# Patient Record
Sex: MALE | Race: BLACK OR AFRICAN AMERICAN | NOT HISPANIC OR LATINO | Employment: UNEMPLOYED | URBAN - METROPOLITAN AREA
[De-identification: names, ages, dates, MRNs, and addresses within clinical notes are randomized per-mention and may not be internally consistent; named-entity substitution may affect disease eponyms.]

---

## 2020-11-28 ENCOUNTER — HOSPITAL ENCOUNTER (EMERGENCY)
Facility: CLINIC | Age: 35
Discharge: HOME | End: 2020-11-28
Attending: EMERGENCY MEDICINE | Admitting: EMERGENCY MEDICINE
Payer: COMMERCIAL

## 2020-11-28 VITALS
SYSTOLIC BLOOD PRESSURE: 104 MMHG | TEMPERATURE: 98.4 F | OXYGEN SATURATION: 97 % | DIASTOLIC BLOOD PRESSURE: 60 MMHG | HEART RATE: 69 BPM | RESPIRATION RATE: 12 BRPM

## 2020-11-28 DIAGNOSIS — Z20.822 SUSPECTED COVID-19 VIRUS INFECTION: ICD-10-CM

## 2020-11-28 LAB
SARS-COV-2 RNA SPEC QL NAA+PROBE: NOT DETECTED
SPECIMEN SOURCE: NORMAL

## 2020-11-28 PROCEDURE — 99283 EMERGENCY DEPT VISIT LOW MDM: CPT | Performed by: EMERGENCY MEDICINE

## 2020-11-28 PROCEDURE — 250N000013 HC RX MED GY IP 250 OP 250 PS 637: Performed by: EMERGENCY MEDICINE

## 2020-11-28 PROCEDURE — 250N000013 HC RX MED GY IP 250 OP 250 PS 637

## 2020-11-28 PROCEDURE — U0003 INFECTIOUS AGENT DETECTION BY NUCLEIC ACID (DNA OR RNA); SEVERE ACUTE RESPIRATORY SYNDROME CORONAVIRUS 2 (SARS-COV-2) (CORONAVIRUS DISEASE [COVID-19]), AMPLIFIED PROBE TECHNIQUE, MAKING USE OF HIGH THROUGHPUT TECHNOLOGIES AS DESCRIBED BY CMS-2020-01-R: HCPCS | Performed by: EMERGENCY MEDICINE

## 2020-11-28 PROCEDURE — C9803 HOPD COVID-19 SPEC COLLECT: HCPCS | Performed by: EMERGENCY MEDICINE

## 2020-11-28 RX ORDER — IBUPROFEN 600 MG/1
600 TABLET, FILM COATED ORAL ONCE
Status: COMPLETED | OUTPATIENT
Start: 2020-11-28 | End: 2020-11-28

## 2020-11-28 RX ORDER — ACETAMINOPHEN 325 MG/1
975 TABLET ORAL ONCE
Status: COMPLETED | OUTPATIENT
Start: 2020-11-28 | End: 2020-11-28

## 2020-11-28 RX ORDER — ACETAMINOPHEN 325 MG/1
TABLET ORAL
Status: COMPLETED
Start: 2020-11-28 | End: 2020-11-28

## 2020-11-28 RX ADMIN — ACETAMINOPHEN 975 MG: 325 TABLET, FILM COATED ORAL at 01:59

## 2020-11-28 RX ADMIN — IBUPROFEN 600 MG: 600 TABLET, FILM COATED ORAL at 01:59

## 2020-11-28 RX ADMIN — ACETAMINOPHEN 325 MG: 325 TABLET, FILM COATED ORAL at 02:04

## 2020-11-28 ASSESSMENT — ENCOUNTER SYMPTOMS
COUGH: 1
SHORTNESS OF BREATH: 1
FEVER: 0
ABDOMINAL PAIN: 0

## 2020-11-28 NOTE — ED AVS SNAPSHOT
[facility] Emergency Department  [address]  [place] [address]  Phone: [phone]  Fax: [phone]                                    Blayne Espinoza   MRN: [id number]    Department: [facility] Emergency Department   Date of Visit: 11/28/2020           After Visit Summary Signature Page    I have received my discharge instructions, and my questions have been answered. I have discussed any challenges I see with this plan with the nurse or doctor.    ..........................................................................................................................................  Patient/Patient Representative Signature      ..........................................................................................................................................  Patient Representative Print Name and Relationship to Patient    ..................................................               ................................................  Date                                   Time    ..........................................................................................................................................  Reviewed by Signature/Title    ...................................................              ..............................................  Date                                               Time          22EPIC Rev 08/18

## 2020-11-28 NOTE — DISCHARGE INSTRUCTIONS
TODAY'S VISIT  YOU ARE BEING TESTED FOR COVID-19 (NOVEL CORONAVIRUS).   -  The cause of your symptoms is not yet known, but you are being tested for COVID-19.  -  It has been determined that you do not medically need to be hospitalized at this time, and  you can be monitored with self-isolation at home.     YOUR TESTS FOR THIS ILLNESS ARE CURRENTLY IN PROCESS.    -  These tests are performed by the [place] Department of Health.  -  Our staff will contact you with your results, likely within the next 24-72 hours.  -  If your test is positive, you will also be contacted by the [place] Department [facility].   -  Please remain under home isolation precautions until your healthcare provider and/or state and local health department tell you it is safe, and you no longer need to self-isolate at home.     SELF-ISOLATION INSTRUCTIONS  STAY HOME. Do not go to work, school, or public areas. Avoid using public transportation, ride-sharing (Uber/Lyft), or taxis. You should only leave your home if you require medical attention (See instructions below, please contact your provider first.)   SEPARATE YOURSELF FROM OTHER PEOPLE AND ANIMALS. As much as possible, you should stay in a specific room and away from other people in your home. You should use a separate bathroom, if available. Avoid contact with pets and other animals. When possible, have another household member care for your  family and animals while you are sick.   DO NOT SHARE HOUSEHOLD ITEMS. Do not share dishes, drinking glasses, eating utensils, towels, bedding, etc., with others or pets in your home. These items should be washed with soap and water.   WEAR A FACEMASK. Wear a facemask if you need to be around other people, and cover your mouth and nose with tissue when you cough or sneeze.  WASH YOUR HANDS OFTEN. Wash your hands with soap and water for at least 20 second, or use hand  regularly. Avoid touching your face with unwashed hands.      SELF-MONITORING INSTRUCTIONS  SEEK PROMPT MEDICAL ATTENTION IF YOUR ILLNESS IS WORSENING. Watch closely for new or worsening symptoms, such as fever, cough, shortness of breath or difficulty breathing.   SIGN UP FOR [place]. Sign up for the Swirl application, using the information at the end of this document. Your results and a message about those results can be sent through [employer]. If you do not have ZummZummhart, we will call you with your results, but it may take longer.  IF YOU NEED MEDICAL CARE OR HAVE A MEDICAL APPOINTMENT (and it is not an emergency):   -  CALL YOUR HEALTHCARE PROVIDER BEFORE GOING TO THE [facility]  -  TELL THEM THAT YOU ARE BEING TESTING FOR AND MAY HAVE COVID-19.  YOUR CLOSE CONTACTS SHOULD MONITOR THEIR HEALTH. If your close contacts develop symptoms, they should visit [email or web address] to determine if testing is needed, and where this is done.   If you have any questions, please contact your usual health care provider or the [facility] ([facility]) at [phone]    EMERGENCY INSTRUCTIONS  IF YOU NEED EMERGENCY MEDICAL ATTENTION, CALL 911 AND LET THEM KNOW YOU MAY HAVE ARE BEING EVALUATED FOR COVID-19.      WHAT IS COVID-19 (CORONAVIRUS)  COVID-19 is a viral respiratory illness caused by a newly identified coronavirus that was discovered in late 2019 in [place]. Coronaviruses are a large family of viruses. Some coronaviruses cause illnesses in people and others only circulate among animals. Rarely, animal coronaviruses can evolve and infect people. The virus causing COVID-19 may have emerged from an animal source, and it is now able to spread from person to person.     How does it spread?   The virus is thought to spread between people who are in close contact (within about six feet) through respiratory droplets produced when an infected person coughs, sneezes, or talks. It may also spread when one person touches a surface or object that has the virus on it and then  touches their mouth, nose, or eyes. However, this is not thought to be the main way the virus is transmitted.    SYMPTOMS  This coronavirus causes mild to severe respiratory illness with often with fever, cough, and/or difficulty breathing. After exposure, symptoms typically present within 2 to 14 days.     TREATMENTS AND PREVENTION  Patients may also be asked to self-quarantine at home in order to prevent the spread of the coronavirus. There is no antiviral treatment recommended for COVID-19. People with COVID-19 may receive supportive care to help relieve symptoms.    Prevention  No vaccine is currently available for the coronavirus causing COVID-19. The best way to prevent spread of the illness is to avoid exposure through simple precautions. Prevention steps include:   Stay home if you're feeling sick.   Avoid close contact with others, and try to stay at least 6-feet away from others if you're ill.   Wash your hands often with soap and warm water for at least 20 seconds, especially after going to the bathroom; before eating; and after blowing your nose, coughing, or sneezing. Use an alcohol-based hand  with at least 60 percent alcohol if soap and water are not readily available.   Clean and disinfect frequently touched objects and surfaces using a regular household cleaning spray or wipe.     Thank you for your understanding and cooperation.
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
## 2021-03-08 ENCOUNTER — OFFICE VISIT (OUTPATIENT)
Dept: BEHAVIORAL HEALTH | Facility: CLINIC | Age: 36
End: 2021-03-08
Payer: COMMERCIAL

## 2021-03-08 ENCOUNTER — HOSPITAL ENCOUNTER (EMERGENCY)
Facility: CLINIC | Age: 36
Discharge: HOME | End: 2021-03-08
Admitting: EMERGENCY MEDICINE
Payer: COMMERCIAL

## 2021-03-08 VITALS
SYSTOLIC BLOOD PRESSURE: 116 MMHG | DIASTOLIC BLOOD PRESSURE: 70 MMHG | TEMPERATURE: 97.9 F | HEART RATE: 88 BPM | OXYGEN SATURATION: 99 % | RESPIRATION RATE: 16 BRPM

## 2021-03-08 DIAGNOSIS — F11.90 OPIOID USE DISORDER: Primary | ICD-10-CM

## 2021-03-08 DIAGNOSIS — F11.10 OPIOID ABUSE (H): ICD-10-CM

## 2021-03-08 LAB
AMPHETAMINES UR QL SCN: NEGATIVE
BARBITURATES UR QL: NEGATIVE
BENZODIAZ UR QL: NEGATIVE
CANNABINOIDS UR QL SCN: NEGATIVE
COCAINE UR QL: NEGATIVE
ETHANOL UR QL SCN: NEGATIVE
FENTANYL UR QL: POSITIVE
OPIATES UR QL SCN: POSITIVE
OPIATES UR QL SCN: POSITIVE

## 2021-03-08 PROCEDURE — 99205 OFFICE O/P NEW HI 60 MIN: CPT | Performed by: FAMILY MEDICINE

## 2021-03-08 PROCEDURE — 80307 DRUG TEST PRSMV CHEM ANLYZR: CPT | Performed by: EMERGENCY MEDICINE

## 2021-03-08 PROCEDURE — 80320 DRUG SCREEN QUANTALCOHOLS: CPT | Performed by: EMERGENCY MEDICINE

## 2021-03-08 PROCEDURE — 99284 EMERGENCY DEPT VISIT MOD MDM: CPT | Performed by: EMERGENCY MEDICINE

## 2021-03-08 PROCEDURE — 99283 EMERGENCY DEPT VISIT LOW MDM: CPT

## 2021-03-08 RX ORDER — GABAPENTIN 300 MG/1
300 CAPSULE ORAL 3 TIMES DAILY
Qty: 9 CAPSULE | Refills: 0 | Status: SHIPPED | OUTPATIENT
Start: 2021-03-08 | End: 2021-03-15

## 2021-03-08 RX ORDER — BUPRENORPHINE AND NALOXONE 8; 2 MG/1; MG/1
2 FILM, SOLUBLE BUCCAL; SUBLINGUAL DAILY
Qty: 14 FILM | Refills: 0 | Status: SHIPPED | OUTPATIENT
Start: 2021-03-08 | End: 2021-03-15

## 2021-03-08 RX ORDER — CLONIDINE HYDROCHLORIDE 0.1 MG/1
0.1 TABLET ORAL 4 TIMES DAILY PRN
Qty: 12 TABLET | Refills: 0 | Status: SHIPPED | OUTPATIENT
Start: 2021-03-08 | End: 2021-03-15

## 2021-03-08 RX ORDER — ONDANSETRON 4 MG/1
4 TABLET, FILM COATED ORAL EVERY 8 HOURS PRN
Qty: 9 TABLET | Refills: 0 | Status: SHIPPED | OUTPATIENT
Start: 2021-03-08

## 2021-03-08 RX ORDER — TRAZODONE HYDROCHLORIDE 50 MG/1
50 TABLET, FILM COATED ORAL AT BEDTIME
Qty: 30 TABLET | Refills: 0 | Status: SHIPPED | OUTPATIENT
Start: 2021-03-08

## 2021-03-08 ASSESSMENT — ENCOUNTER SYMPTOMS
FEVER: 0
COUGH: 0
CHILLS: 1
NAUSEA: 0
ABDOMINAL PAIN: 0
MYALGIAS: 1
VOMITING: 0
ARTHRALGIAS: 1
DIAPHORESIS: 1
SLEEP DISTURBANCE: 1

## 2021-03-08 NOTE — PATIENT INSTRUCTIONS
"When it has been 24 hours from your last use of other opioids, start buprenorphine with 2mg (1/4 of 8mg film.)  Wait 30 minutes.    If withdrawal symptoms are not worse, take the remaining 3/4 of film (6mg.)  If withdrawal symptoms do worsen with this first \"test dose,\" use other medications for withdrawal symptoms, and start buprenorphine again the next day.     After the first 8mg dose on day one, if you develop more withdrawal symptoms or cravings in 1-2 hours, you can take another 1/2 film (4mg.)  Again, 2 hours later, you can take another 1/2 film (4mg) to treat symptoms.     Starting day 2, take 16mg buprenorphine daily until your next appointment.     Schedule a follow up appointment in the [facility] in 2 days.  Let medical staff know of any problems in the meantime.   [facility] [address] [address]    Phone: [phone]    Hours: Monday, Wednesday, Friday 8:30a-4:30p    After hours medical questions: [phone]    "

## 2021-03-08 NOTE — DISCHARGE INSTRUCTIONS
Opioid Withdrawal  Opioid withdrawal occurs if you've used opioids daily for at least 3 weeks. Symptoms often start about 12 hours after the last dose of the opioid. But this varies greatly. It depends on which opioid you were using and how you were taking it (injecting, snorting, or pills). Withdrawal symptoms last from 3 to 5 days. They may include yawning, sweating, runny nose, restlessness, stomach cramping, diarrhea, nausea, vomiting, hot and cold flashes, and trouble sleeping.   Home care  Follow these guidelines when caring for yourself at home:   Stay with someone who can help you and give you emotional support during this time. Don't take more of the addicting drug to stop your symptoms.  If you have stomach cramps, nausea, or vomiting, take only clear liquids until the symptoms get better. Adults should drink a total of 2 to 3 quarts of liquid daily. It's best to take small frequent drinks rather than a few large ones. You may have liquids in any of these forms: mineral water, apple juice, sports drinks, soft drinks without caffeine, clear broth soups, plain gelatin, and ice pops.  If you've been prescribed medicines to help manage your withdrawal symptoms, take them exactly as prescribed. Don't change or stop your medicine without calling your provider.  Don't use alcohol, caffeine, or tobacco during this time.  If you were given a clonidine patch, leave this on for 7 days. Call your provider if you have excess dizziness or drowsiness.  Follow-up care  Follow up with your healthcare provider as advised. After you've gone through withdrawal, enroll in an outpatient treatment program. Getting through withdrawal is the first step in a long journey to living a drug-free life. Having trained professionals support you will make it more likely that you'll succeed.   When to get medical advice  Call your healthcare provider right away if you have any of these:   Fever of 100.4 F (38 C) or higher, or as directed  by your provider  Shaking chills  Inability to keep down liquids for 8 hours  Frequent diarrhea  Signs of infection at the site of IV needle use (redness, warmth, pain, or swelling)  Call 911  Call 911 if any of these occur:   Trouble breathing, swallowing, or wheezing  Severe confusion  Extreme drowsiness or trouble awakening  Fainting (loss of consciousness)  Rapid heart rate or very slow heart rate  Very low or very high blood pressure  Vomiting blood, or large amounts of blood in stool  Seizure  [employer]Well last reviewed this educational content on 2/1/2020 2000-2020 [employer]. All rights reserved. This information is not intended as a substitute for professional medical care. Always follow your healthcare professional's instructions.          Naloxone (Narcan)  Frequently-asked questions  What is naloxone (Narcan)?  Naloxone (Narcan) is a prescription medicine that can reverse an overdose from opioid medicines, such as:  codeine, morphine  hydrocodone, oxycodone, hydromorphone, oxymorphone  fentanyl, meperidine, methadone  buprenorphine  Opioids can cause bad reactions. If you take more opioids than your body can handle (overdose), your breathing can slow too much or even stop. Naloxone blocks the effects of opioids on the brain, which can quickly reverse an overdose.   Naloxone cannot be used to get high and is not addictive. It is safe and effective. Emergency medical professionals have used it to save lives for decades.  Naloxone does not prevent deaths caused by other drugs, such as bath salts, cocaine, methamphetamine, alcohol and benzodiazepines: alprazolam, clonazepam, diazepam, lorazepam.  Who can carry or administer naloxone?  In [place], anyone at risk for having a drug overdose or witnessing one can get a prescription for naloxone. Users, family members and concerned friends can all carry naloxone in the same way people with allergies are allowed to carry an epinephrine syringe  "(\"epi-pen\").   For safety, store naloxone in a locked cabinet or other space that is out of the reach of children and pets.  When should naloxone be given?  If you suspect an opioid overdose, look for these symptoms:  Breathing slows or stops; or, person has pinpoint pupils and won't wake up  No response, even if you shake them or say  their name  Lips and fingernails turn blue, purple or gray  Skin gets painful or clammy  Slowed heartbeat and/or low blood pressure  Even if you have reversed an overdose with naloxone, always call 911. Naloxone usually wears off in 30 to 90 minutes. The person can stop breathing again unless more naloxone is available. An overdose victim will also need other care. For these reasons, it is safest to call 911 and get medical care right away.  How long does naloxone take to work?  Naloxone begins to work in 2 to 5 minutes.  If the person doesn't wake up in 3 minutes, bystanders should give a second dose.  Rescue breathing should be done while you wait for the naloxone to work. This will make sure the person gets enough oxygen to the brain.  How do you give naloxone?  Bystanders can safely and legally spray naloxone into the nose (nasal spray) or inject it into the thigh.   Nasal spray (with assembly): Place the foam tip (atomizer) onto a syringe and put into the nostril. Spray one half of the capsule (1 mg) into each nostril.  Nasal spray (no assembly needed): Spray up one nostril by pushing the plunger.  Injection into the muscle (with assembly or by auto-injector): Inject into the outer thigh. In an emergency, it is safe to inject through clothing. The auto-injector contains a speaker that provides step-by-step instructions.  Can naloxone harm someone?  No. It is safe to give naloxone any time you suspect an overdose. (It will not hurt if you are wrong about it being an overdose.) People who receive naloxone for an overdose may wake up and go into withdrawal. Withdrawal can be " "miserable, but it is better than dying.   Symptoms of withdrawal include:  Feeling nervous, restless or irritable  Body aches  Dizziness or weakness  Diarrhea, stomach pain or feeling a little sick  Fever, chills or goose bumps  Sneezing or runny nose  Talk with your doctor about how to deal with these and other feelings of withdrawal.   Is naloxone just a \"safety net\" that allows users to use even more?  Research has found that having naloxone available does not encourage people to use opioids more. The goal of distributing naloxone and educating people about how to prevent, recognize and intervene in overdoses is to prevent deaths. Other goals, such as decreasing drug use, can only be met if the user is alive.  What are the side effects of naloxone?  Call 911 right away if you have:  An allergic reaction, such as large bumps on your skin (hives), trouble breathing, swelling of the face, lips, tongue or throat. (Don't drive or perform unsafe tasks until you know how you will react to naloxone.)  Chest pain or fast or irregular heart beat  Increase in blood pressure  Muscle pain or cramping  Nasal dryness, congestion or inflammation  Shortness of breath  Sweating, feeling very sick to your stomach or throwing up  Bad headache, agitation, anxiety, confusion or ringing in your ears  Seizures (convulsions)  Dizziness, fainting or feeling like you might pass out  For informational purposes only. Not to replace the advice of your health care provider.   Copyright   2015 [facility]. All rights reserved. [employer] [id number] - REV 07/17.      Instructions:     To manage symptoms of withdrawal, please take your medicines as directed.     If symptoms are severe and the medicines don t help, return to the emergency room.  You will be evaluated and treated for withdrawal symptoms which may include additional buprenorphine (Suboxone, Subutex).  "

## 2021-03-08 NOTE — PROGRESS NOTES
"[facility]    SUBJECTIVE                                                    CC/HPI:    Blayne Espinoza is a 36 year old male w/ PMH HIV, DVT, OUD who presents to the Recovery Clinic for  initial visit to discuss treatment of opioid use disorder.        SUBSTANCE(S)  OF CHOICE   Heroin/fentanyl         Date of last use   3/7/21  History of use  (First use, amount, duration) \"Its been a long time\" smoking uncertain amount of fentanyl daily for at least the last 2 years.  Withdrawal symptoms hot/cold sweats, body aches, restlessness, anxiety, runny nose, GI upset  IV drug use yes in past,  most recent IVDU few months ago  Previous medical treatments (buprenorphine, methadone, naltrexone) denies  Previous psychosocial treatments (residential, IOP, OP, mutual help groups) denies  Longest period of sobriety unknown  Medical complications from substance use (ie. infection, organ damage, seizures) h/o MRSA cellulitis and R UE DVT  History of overdose   unknown  Narcan currently available no    Clinical Opioid Withdrawal Scale (COWS)    Resting Pulse Rate  1  =     Sweating    (over past 1/2 hour) 1  =  subjective report of chills or flushing   Restlessness  3  =  frequent shifting or extraneous movements of legs/arms   Pupil size  1  =  pupils possibly larger than normal for room light   Bone or Joint Aches    (acute only) 1  =  mild diffuse discomfort   Runny nose or tearing    (unrelated to cold/allergies) 2  =  nose running or tearing   GI Upset    (over past 1/2 hour) 2  =  nausea or loose stool   Tremor    (outstretched hands) 0  =  no tremor   Yawning    (during assessment) 0  =  no yawning   Anxiety/Irritability 1  =  patient reports increasing irritability or anxiousness   Gooseflesh skin 0  =  skin is smooth     TOTAL SCORE  Add column for score   12     CONSIDER dose of suboxone with COWS >=8 (moderate withdrawal)  AND if last dose of opioid:    >12hr ago with short acting " agent (heroin, morphine, oxycodone, hydrocodone, fentanyl, etc)    >24hr ago with long acting agent (oxycontin, MS contin, etc)    >4 days ago with methadone      Other Substance Use/Addiction History:  Alcohol  denies  Other opioids only above currently  Benzodiazepines  denies  Stimulants (amphetamine, methamphetamine, cocaine) denies  Cannabis denies  Hallucinogens   denies  Anabolic steroids denies   Behavioral (Gambling, shopping, eating disorder) denies    NICOTINE-few day   Desire to quit no        Hepatitis C Status (born between 1946-65 should have one lifetime test)   Last lab work done 2/12/20 HCV ab nonreactive      [place] Board of Pharmacy Data Base Reviewed:  Yes; as expected      Past Medical History:   Diagnosis Date     Asymptomatic human immunodeficiency virus (HIV) infection status (H)      Latent syphilis     RPR converted to nonreactive 2020       PAST PSYCHIATRIC HISTORY:  Past diagnoses (reported by patient) - none  Suicide Attempts: No     [facility]   Psychiatric provider or therapist: none    No past surgical history on file.    No family history on file.    Social History     Social History Narrative     Not on file         Medications:  No current outpatient medications on file prior to visit.  No current facility-administered medications on file prior to visit.   Pt states he is not on any medications; he is being followed for HIV treatment at [facility], and viral load undetectable as recently as 2/24/21 and pt has had ED visits in past requesting refills of antiretrovirals        No Known Allergies        REVIEW OF SYSTEMS:  General: + acute withdrawal symptoms, see above.  No recent infections or fever  Eyes:  No vision concerns.  No double vision.    Resp: No coughing, wheezing or shortness of breath  CV: No chest pains or palpitations  GI: No  vomiting, abdominal pain, diarrhea.  No constipation  : No urinary frequency or  dysuria    Musculoskeletal: No significant muscle or joint pains other than as above.  No edema  Neurologic: No numbness, tingling, weakness, problems with balance or coordination  Psychiatric: No acute concerns other than as above. See mental status exam for more information.   Skin: No rashes or areas of acute infection      OBJECTIVE                                                        VS from ED reviewed  Physical Exam  Constitutional:       Interventions: Face mask in place.      Comments: Slightly disheveled   HENT:      Head: Normocephalic and atraumatic.   Eyes:      General: No scleral icterus.     Extraocular Movements: Extraocular movements intact.      Conjunctiva/sclera: Conjunctivae normal.   Pulmonary:      Effort: Pulmonary effort is normal.   Neurological:      Mental Status: He is alert and oriented to person, place, and time.      Motor: No tremor.      Coordination: Coordination is intact.      Gait: Gait is intact.   Psychiatric:         Attention and Perception: Attention and perception normal.         Mood and Affect: Mood is anxious. Affect is angry.         Speech: Speech normal.         Behavior: Behavior is agitated. Behavior is cooperative.         Thought Content: Thought content normal.         Cognition and Memory: Cognition normal.      Comments: Insight and judgement are fair           Labs:  Recent Results (from the past 240 hour(s))   Drug abuse screen 6 urine (tox)    Collection Time: 03/08/21  8:47 AM   Result Value Ref Range    Amphetamine Qual Urine Negative NEG^Negative    Barbiturates Qual Urine Negative NEG^Negative    Benzodiazepine Qual Urine Negative NEG^Negative    Cannabinoids Qual Urine Negative NEG^Negative    Cocaine Qual Urine Negative NEG^Negative    Ethanol Qual Urine Negative NEG^Negative    Opiates Qualitative Urine Positive (A) NEG^Negative   Fentanyl Qual Urine    Collection Time: 03/08/21  8:47 AM   Result Value Ref Range    Fentanyl Qual Urine Positive (A)  NEG^Negative   Opiates qualitative urine    Collection Time: 03/08/21  8:47 AM   Result Value Ref Range    Opiates Qualitative Urine Positive (A) NEG^Negative           Recent Results (from the past 240 hour(s))   Drug abuse screen 6 urine (tox)    Collection Time: 03/08/21  8:47 AM   Result Value Ref Range    Amphetamine Qual Urine Negative NEG^Negative    Barbiturates Qual Urine Negative NEG^Negative    Benzodiazepine Qual Urine Negative NEG^Negative    Cannabinoids Qual Urine Negative NEG^Negative    Cocaine Qual Urine Negative NEG^Negative    Ethanol Qual Urine Negative NEG^Negative    Opiates Qualitative Urine Positive (A) NEG^Negative   Fentanyl Qual Urine    Collection Time: 03/08/21  8:47 AM   Result Value Ref Range    Fentanyl Qual Urine Positive (A) NEG^Negative   Opiates qualitative urine    Collection Time: 03/08/21  8:47 AM   Result Value Ref Range    Opiates Qualitative Urine Positive (A) NEG^Negative         ASSESSMENT/PLAN                                                      1. Opioid use disorder (H)  Reviewed instructions for initiation of buprenorphine in setting of fentanyl use to reduce risk of precipitated withdrawal and maximize efficacy.   Encouraged pt to fill naloxone rx and keep this accessible  Discussed use of other medications as needed for withdrawal    - naloxone (NARCAN) 4 MG/0.1ML nasal spray; Spray 1 spray (4 mg) into one nostril alternating nostrils once as needed for opioid reversal every 2-3 minutes until assistance arrives  Dispense: 0.2 mL; Refill: 0  - cloNIDine (CATAPRES) 0.1 MG tablet; Take 1 tablet (0.1 mg) by mouth 4 times daily as needed (withdrawal symptoms)  Dispense: 12 tablet; Refill: 0  - ondansetron (ZOFRAN) 4 MG tablet; Take 1 tablet (4 mg) by mouth every 8 hours as needed for nausea  Dispense: 9 tablet; Refill: 0  - gabapentin (NEURONTIN) 300 MG capsule; Take 1 capsule (300 mg) by mouth 3 times daily  Dispense: 9 capsule; Refill: 0  - traZODone (DESYREL) 50 MG  tablet; Take 1 tablet (50 mg) by mouth At Bedtime  Dispense: 30 tablet; Refill: 0  - buprenorphine HCl-naloxone HCl (SUBOXONE) 8-2 MG per film; Place 2 Film under the tongue daily  Dispense: 14 Film; Refill: 0         Return in about 2 days (around 3/10/2021) for Follow up, with me, in person.      Patient counseling completed today:  Discussed mechanism of action, potential risks/benefits/side effects of medications and other recommendations above.  Discussed risk of precipitated withdrawal with initiation of buprenorphine in the presence of full opioid agonists.  Reviewed directions for initiation of buprenorphine to reduce risk of precipitated withdrawal and maximize efficacy.  Recommend pt keep naloxone in their possession and reviewed other aspects of harm reduction to reduce risk of overdose with return to use.   Recommended avoiding concurrent use of alcohol, benzodiazepines or other sedatives with buprenorphine or other opioids.  Discussed importance of avoiding isolation, building a network of supportive relationships, avoiding people/places/things associated with past use to reduce risk of relapse; including motivational interviewing regarding psychosocial treatment for addiction.     At least 60 min spent in review of medical record,  review, obtaining histories, reviewing symptoms, discussing pt's goals for treatment, arranging follow up.      RERE WINTER MD    [place] [id number] [place] [id number] [phone]
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
## 2021-03-09 ENCOUNTER — OFFICE VISIT (OUTPATIENT)
Dept: BEHAVIORAL HEALTH | Facility: CLINIC | Age: 36
End: 2021-03-09
Payer: COMMERCIAL

## 2021-03-09 DIAGNOSIS — F11.20 OPIATE DEPENDENCE (H): Primary | ICD-10-CM

## 2021-03-09 NOTE — PROGRESS NOTES
Blayne has been struggling with drug use since the loss of several loved ones. States that it is so hard not to use when he thinks about them. Is not interested in treatment at this time. Has stable housing with his mother. Girlfriend is a support. Shared my story, offered hope and encouragement.

## 2021-03-10 ENCOUNTER — OFFICE VISIT (OUTPATIENT)
Dept: BEHAVIORAL HEALTH | Facility: CLINIC | Age: 36
End: 2021-03-10
Payer: COMMERCIAL

## 2021-03-10 VITALS
DIASTOLIC BLOOD PRESSURE: 72 MMHG | HEART RATE: 84 BPM | SYSTOLIC BLOOD PRESSURE: 106 MMHG | TEMPERATURE: 98.5 F | RESPIRATION RATE: 16 BRPM

## 2021-03-10 DIAGNOSIS — F11.90 OPIOID USE DISORDER: Primary | ICD-10-CM

## 2021-03-10 DIAGNOSIS — F15.90 STIMULANT USE DISORDER: ICD-10-CM

## 2021-03-10 PROCEDURE — 99214 OFFICE O/P EST MOD 30 MIN: CPT | Performed by: FAMILY MEDICINE

## 2021-03-10 NOTE — PROGRESS NOTES
"CC/HPI:  Blayne Espinoza is a 36 year old male w/ PMH HIV, DVT, PSUD primarily opioids who returns to  for follow up.  He was initially seen in this clinic 3/8/21 expressing interest in starting treatment with buprenorphine.  He was given rx's for symptoms of opioid withdrawal and rx for buprenorphine with directions for home initiation in the setting of fentanyl use.      Today, pt states he has continued to use fentanyl 3/8/21 and 3/9/21.  He also states he did take buprenorphine since our initial visit, but is uncertain when that occurred.  He reports he also smoked cannabis 3/8/21.  He cannot recall taking any of the medications prescribed for symptoms of withdrawal.     Pt states he returned to use of opioids with fentanyl in 8/2019. He cites the death of his children over the course of a few years as a driving factor of his use.      In review of his PMH and medications, pt states he does take his daily antiretroviral tx for HIV, though he cannot recall the name.  He also states he takes \"little white pill,\" that is supposed to be twice daily, but he takes once daily, but does not really take very often.      Pt continues to live with SO and his family.      Objective:  /72 (BP Location: Left arm)   Pulse 84   Temp 98.5  F (36.9  C)   Resp 16   Physical Exam  Constitutional:       Interventions: Face mask in place.      Comments: Pt is sleepy during the visit, at times seemingly falling asleep for a few seconds at a time   Eyes:      General: No scleral icterus.     Conjunctiva/sclera: Conjunctivae normal.   Neurological:      Motor: No tremor.      Coordination: Coordination is intact.      Gait: Gait is intact.       Labs:  UDS: +THC, +opiates, +amphetamine, +methamphetamine, +oxycodone, +MDMA.  (negative buprenorphine)          A/P  (F11.99) Opioid use disorder (H)  (primary encounter diagnosis)  Comment: Pt with continued use, has not started buprenorphine based on lab data  Plan: reviewed " with pt and pt was given written directions for home initiation of buprenorphine, starting when 24hrs from last use, including discussion of symptomatic rx's provided.  Pt stated he can read, and has help from his SO.  He agreed to also bring in his home medications to next visit for clarification and safety issues.  No buprenorphine rx required this visit, #14 8mg/2mg films rx'd 3/8/21.      (F15.90) Stimulant use disorder      At least 30min spent in chart review, discussion of interim events, review of PMH/medications, review of recommendations    RERE WINTER MD

## 2021-03-10 NOTE — PATIENT INSTRUCTIONS
"When it has been 24 hours from your last use of other opioids, start buprenorphine with 2mg (1/4 of 8mg film.)  Wait 30 minutes.      If withdrawal symptoms are not worse, take the remaining 3/4 of film (6mg.)  If withdrawal symptoms do worsen with this first \"test dose,\" use other medications for withdrawal symptoms, and start buprenorphine again the next day.     After the first 8mg dose on day one, if you develop more withdrawal symptoms or cravings in 1-2 hours, you can take another 1/2 film (4mg.)  Again, 2 hours later, you can take another 1/2 film (4mg) to treat symptoms.     Starting day 2, take 16mg buprenorphine daily until your next appointment.     Schedule a follow up appointment in the Recovery Clinic in 2-4 days.  Let medical staff know of any problems in the meantime.     [facility] [address] [address]    Phone: [phone]    Hours: Monday, Wednesday, Friday 8:30a-4:30p    After hours medical questions: [phone]    "

## 2021-03-15 ENCOUNTER — OFFICE VISIT (OUTPATIENT)
Dept: BEHAVIORAL HEALTH | Facility: CLINIC | Age: 36
End: 2021-03-15
Payer: COMMERCIAL

## 2021-03-15 VITALS
HEART RATE: 82 BPM | SYSTOLIC BLOOD PRESSURE: 119 MMHG | TEMPERATURE: 98.7 F | OXYGEN SATURATION: 100 % | DIASTOLIC BLOOD PRESSURE: 76 MMHG

## 2021-03-15 DIAGNOSIS — F11.90 OPIOID USE DISORDER: ICD-10-CM

## 2021-03-15 PROCEDURE — 99214 OFFICE O/P EST MOD 30 MIN: CPT | Performed by: FAMILY MEDICINE

## 2021-03-15 RX ORDER — CLONIDINE HYDROCHLORIDE 0.1 MG/1
0.1 TABLET ORAL 4 TIMES DAILY PRN
Qty: 12 TABLET | Refills: 0 | Status: SHIPPED | OUTPATIENT
Start: 2021-03-15

## 2021-03-15 RX ORDER — BUPRENORPHINE AND NALOXONE 8; 2 MG/1; MG/1
2 FILM, SOLUBLE BUCCAL; SUBLINGUAL DAILY
Qty: 6 FILM | Refills: 0 | Status: SHIPPED | OUTPATIENT
Start: 2021-03-15

## 2021-03-15 NOTE — PATIENT INSTRUCTIONS
"When it has been 24 hours from your last use of other opioids, start buprenorphine with 2mg (1/4 of 8mg film.)  Wait 30 minutes.      If withdrawal symptoms are not worse, take the remaining 3/4 of film (6mg.)  If withdrawal symptoms do worsen with this first \"test dose,\" use other medications for withdrawal symptoms, and start buprenorphine again the next day.     After the first 8mg dose on day one, if you develop more withdrawal symptoms or cravings in 1-2 hours, you can take another 1/2 film (4mg.)  Again, 2 hours later, you can take another 1/2 film (4mg) to treat symptoms.     Starting day 2, take 16mg buprenorphine daily until your next appointment.     Schedule a follow up appointment in the Recovery Clinic in 2-4 days.  Let medical staff know of any problems in the meantime.     [facility] [address] [address]    Phone: [phone]    Hours: Monday, Wednesday, Friday 8:30a-4:30p    After hours medical questions: [phone]    "

## 2021-03-15 NOTE — PROGRESS NOTES
[facility]    SUBJECTIVE                                                    CC/HPI:    Blayne Espinoza is a 36 year old male who presents to the [facility]  to discuss treatment of opioid use disorder.      Blayne reports he and significant other took more Suboxone than rx'd and then ran out. Were out of all medications by Friday. Patient found out nephew  on Friday. Patient and significant other relapsed and used Fentanyl over the weekend.    Recent Substance Use/Addiction History: Fentanyl yesterday at 4pm     Narcan currently available: Yes    Point of care urine drug screen positive for: MDMA  *POC urine drug screen does not screen for Fentanyl    [place] Board [employer] Pharmacy Data Base Reviewed:  Yes; as expected    Past Medical History:   Diagnosis Date     Asymptomatic human immunodeficiency virus (HIV) infection status (H)      Latent syphilis     RPR converted to nonreactive        Primary care provider: [facility]     No past surgical history on file.    No family history on file.    Medications:  buprenorphine HCl-naloxone HCl (SUBOXONE) 8-2 MG per film, Place 2 Film under the tongue daily (Patient not taking: Reported on 3/15/2021)  cloNIDine (CATAPRES) 0.1 MG tablet, Take 1 tablet (0.1 mg) by mouth 4 times daily as needed (withdrawal symptoms) (Patient not taking: Reported on 3/15/2021)  gabapentin (NEURONTIN) 300 MG capsule, Take 1 capsule (300 mg) by mouth 3 times daily (Patient not taking: Reported on 3/15/2021)  naloxone (NARCAN) 4 MG/0.1ML nasal spray, Spray 1 spray (4 mg) into one nostril alternating nostrils once as needed for opioid reversal every 2-3 minutes until assistance arrives (Patient not taking: Reported on 3/15/2021)  ondansetron (ZOFRAN) 4 MG tablet, Take 1 tablet (4 mg) by mouth every 8 hours as needed for nausea (Patient not taking: Reported on 3/15/2021)  traZODone (DESYREL) 50 MG tablet, Take 1 tablet (50 mg) by mouth  At Bedtime (Patient not taking: Reported on 3/15/2021)    No current facility-administered medications on file prior to visit.      No Known Allergies      REVIEW OF SYSTEMS:  General: mild acute withdrawal symptoms.  No recent infections or fever  Eyes:  No vision concerns.  No double vision.    Resp: No coughing, wheezing or shortness of breath  CV: No chest pains or palpitations  GI: No nausea, vomiting, abdominal pain, diarrhea.  No constipation  : No urinary frequency or dysuria    Musculoskeletal: No significant muscle or joint pains other than as above.  No edema  Neurologic: No numbness, tingling, weakness, problems with balance or coordination  Psychiatric: No acute concerns other than as above. See mental status exam for more information.   Skin: No rashes or areas of acute infection    OBJECTIVE                                                      /76 (BP Location: Left arm)   Pulse 82   Temp 98.7  F (37.1  C)   SpO2 100%     Physical Exam  Constitutional:       Interventions: Face mask in place.      Comments: Slightly disheveled   HENT:      Head: Normocephalic and atraumatic.   Eyes:      General: No scleral icterus.     Extraocular Movements: Extraocular movements intact.      Conjunctiva/sclera: Conjunctivae normal.   Pulmonary:      Effort: Pulmonary effort is normal.   Neurological:      Mental Status: He is alert and oriented to person, place, and time.      Motor: No tremor.      Coordination: Coordination is intact.      Gait: Gait is intact.   Psychiatric:         Attention and Perception: Attention and perception normal.         Mood and Affect: Mood is anxious. Affect is angry.         Speech: Speech normal.         Behavior: Behavior is agitated. Behavior is cooperative.         Thought Content: Thought content normal.         Cognition and Memory: Cognition normal.      Comments: Insight and judgement are fair  Labs:    Point of care urine drug screen positive for: MDMA  *POC  urine drug screen does not screen for Fentanyl    Recent Results (from the past 240 hour(s))   Drug abuse screen 6 urine (tox)    Collection Time: 03/08/21  8:47 AM   Result Value Ref Range    Amphetamine Qual Urine Negative NEG^Negative    Barbiturates Qual Urine Negative NEG^Negative    Benzodiazepine Qual Urine Negative NEG^Negative    Cannabinoids Qual Urine Negative NEG^Negative    Cocaine Qual Urine Negative NEG^Negative    Ethanol Qual Urine Negative NEG^Negative    Opiates Qualitative Urine Positive (A) NEG^Negative   Fentanyl Qual Urine    Collection Time: 03/08/21  8:47 AM   Result Value Ref Range    Fentanyl Qual Urine Positive (A) NEG^Negative   Opiates qualitative urine    Collection Time: 03/08/21  8:47 AM   Result Value Ref Range    Opiates Qualitative Urine Positive (A) NEG^Negative       ASSESSMENT/PLAN                                                      1. Opioid use disorder (H)  Reviewed directions for starting buprenorphine.   Discussed the fact buprenorphine has not been present in his UDS's to this point, and that this is necessary for ongoing rx  Encouraged him to proceed with plans to begin individual therapy    - buprenorphine HCl-naloxone HCl (SUBOXONE) 8-2 MG per film; Place 2 Film under the tongue daily  Dispense: 6 Film; Refill: 0  - cloNIDine (CATAPRES) 0.1 MG tablet; Take 1 tablet (0.1 mg) by mouth 4 times daily as needed (withdrawal symptoms)  Dispense: 12 tablet; Refill: 0       Return in about 2 days (around 3/17/2021) for Follow up, with me, in person.    Patient counseling completed today:  Discussed mechanism of action, potential risks/benefits/side effects of medications and other recommendations above.  Discussed risk of precipitated withdrawal with initiation of buprenorphine in the presence of full opioid agonists.  Reviewed directions for initiation of buprenorphine to reduce risk of precipitated withdrawal and maximize efficacy.  Recommend pt keep naloxone in their  possession and reviewed other aspects of harm reduction to reduce risk of overdose with return to use.   Recommended avoiding concurrent use of alcohol, benzodiazepines or other sedatives with buprenorphine or other opioids.  Discussed importance of avoiding isolation, building a network of supportive relationships, avoiding people/places/things associated with past use to reduce risk of relapse; including motivational interviewing regarding psychosocial treatment for addiction.     At least 30 min spent in review of medical record,  review, obtaining histories, reviewing symptoms, discussing pt's goals for treatment, counseling noted above.    RERE WINTER MD    [place] [id number] [address] [id number] [phone]

## 2021-03-19 ENCOUNTER — TELEPHONE (OUTPATIENT)
Dept: BEHAVIORAL HEALTH | Facility: CLINIC | Age: 36
End: 2021-03-19

## 2021-03-19 NOTE — TELEPHONE ENCOUNTER
patient called w/ another patient to reschedule appt from 3/17/21 to 3/18. patient will come in 2;30 pm

## 2021-03-22 ENCOUNTER — TELEPHONE (OUTPATIENT)
Dept: BEHAVIORAL HEALTH | Facility: CLINIC | Age: 36
End: 2021-03-22

## 2021-03-22 NOTE — TELEPHONE ENCOUNTER
Reason for call: patiet and spouse cancelled appt, cant get out of bed. thirs time rescheduling. will have to NOW  be seen as WALK IN     Phone number to reach patient:  Home number on file [phone] (home)    Best Time:  any    Can we leave a detailed message on this number?  YES    Travel screening: Negative

## 2021-04-05 ENCOUNTER — TELEPHONE (OUTPATIENT)
Dept: BEHAVIORAL HEALTH | Facility: CLINIC | Age: 36
End: 2021-04-05

## 2021-04-22 ENCOUNTER — TELEPHONE (OUTPATIENT)
Dept: BEHAVIORAL HEALTH | Facility: CLINIC | Age: 36
End: 2021-04-22

## 2021-04-22 NOTE — TELEPHONE ENCOUNTER
Writer called patient in attempt to schedule appointment at [facility], message left with clinic phone number and clinic hours.

## 2022-12-02 NOTE — NURSING NOTE
Reason for visit: Follow up    /72 (BP Location: Left arm)   Pulse 84   Temp 98.5  F (36.9  C)   Resp 16     Point of care urine drug screen positive for: amphetamines, methamphetamines, MDMA and morphine, THC, oxycodone  *POC urine drug screen does not screen for Fentanyl    Last substance use, if any: 3/8/21 Perc 30 (Fentanyl) and THC following appt     Does patient have prescription for Narcan? Yes    Josselin Anderson RN on 3/10/2021 at 2:51 PM       Physician/Patient

## 2022-12-06 ENCOUNTER — APPOINTMENT (OUTPATIENT)
Dept: CT IMAGING | Facility: CLINIC | Age: 37
End: 2022-12-06
Attending: EMERGENCY MEDICINE
Payer: COMMERCIAL

## 2022-12-06 ENCOUNTER — HOSPITAL ENCOUNTER (EMERGENCY)
Facility: CLINIC | Age: 37
Discharge: HOME | End: 2022-12-06
Attending: EMERGENCY MEDICINE | Admitting: EMERGENCY MEDICINE
Payer: COMMERCIAL

## 2022-12-06 VITALS
OXYGEN SATURATION: 99 % | HEART RATE: 83 BPM | TEMPERATURE: 98.8 F | SYSTOLIC BLOOD PRESSURE: 125 MMHG | RESPIRATION RATE: 24 BRPM | DIASTOLIC BLOOD PRESSURE: 77 MMHG | HEIGHT: 65 IN

## 2022-12-06 DIAGNOSIS — E87.6 HYPOKALEMIA: ICD-10-CM

## 2022-12-06 DIAGNOSIS — R55 VASOVAGAL SYNCOPE: ICD-10-CM

## 2022-12-06 DIAGNOSIS — E86.0 DEHYDRATION: ICD-10-CM

## 2022-12-06 LAB
ALBUMIN SERPL BCG-MCNC: 4.7 G/DL (ref 3.5–5.2)
ALP SERPL-CCNC: 84 U/L (ref 40–129)
ALT SERPL W P-5'-P-CCNC: 18 U/L (ref 10–50)
ANION GAP SERPL CALCULATED.3IONS-SCNC: 19 MMOL/L (ref 7–15)
AST SERPL W P-5'-P-CCNC: 22 U/L (ref 10–50)
ATRIAL RATE - MUSE: 87 BPM
BASOPHILS # BLD AUTO: 0.1 10E3/UL (ref 0–0.2)
BASOPHILS NFR BLD AUTO: 0 %
BILIRUB SERPL-MCNC: 0.8 MG/DL
BUN SERPL-MCNC: 13.2 MG/DL (ref 6–20)
CALCIUM SERPL-MCNC: 10 MG/DL (ref 8.6–10)
CHLORIDE SERPL-SCNC: 94 MMOL/L (ref 98–107)
CREAT SERPL-MCNC: 1.19 MG/DL (ref 0.67–1.17)
DEPRECATED HCO3 PLAS-SCNC: 25 MMOL/L (ref 22–29)
DIASTOLIC BLOOD PRESSURE - MUSE: NORMAL MMHG
EOSINOPHIL # BLD AUTO: 0.1 10E3/UL (ref 0–0.7)
EOSINOPHIL NFR BLD AUTO: 0 %
ERYTHROCYTE [DISTWIDTH] IN BLOOD BY AUTOMATED COUNT: 14.1 % (ref 10–15)
GFR SERPL CREATININE-BSD FRML MDRD: 80 ML/MIN/1.73M2
GLUCOSE SERPL-MCNC: 88 MG/DL (ref 70–99)
HCT VFR BLD AUTO: 64.6 % (ref 40–53)
HGB BLD-MCNC: 21.4 G/DL (ref 13.3–17.7)
IMM GRANULOCYTES # BLD: 0.1 10E3/UL
IMM GRANULOCYTES NFR BLD: 0 %
INR PPP: 1.13 (ref 0.85–1.15)
INTERPRETATION ECG - MUSE: NORMAL
LACTATE SERPL-SCNC: 1.9 MMOL/L (ref 0.7–2)
LIPASE SERPL-CCNC: 63 U/L (ref 13–60)
LYMPHOCYTES # BLD AUTO: 1.8 10E3/UL (ref 0.8–5.3)
LYMPHOCYTES NFR BLD AUTO: 13 %
MCH RBC QN AUTO: 27.8 PG (ref 26.5–33)
MCHC RBC AUTO-ENTMCNC: 33.1 G/DL (ref 31.5–36.5)
MCV RBC AUTO: 84 FL (ref 78–100)
MONOCYTES # BLD AUTO: 0.8 10E3/UL (ref 0–1.3)
MONOCYTES NFR BLD AUTO: 6 %
NEUTROPHILS # BLD AUTO: 10.6 10E3/UL (ref 1.6–8.3)
NEUTROPHILS NFR BLD AUTO: 81 %
NRBC # BLD AUTO: 0 10E3/UL
NRBC BLD AUTO-RTO: 0 /100
P AXIS - MUSE: 40 DEGREES
PLATELET # BLD AUTO: 251 10E3/UL (ref 150–450)
POTASSIUM SERPL-SCNC: 3.1 MMOL/L (ref 3.4–5.3)
PR INTERVAL - MUSE: 136 MS
PROT SERPL-MCNC: 8.5 G/DL (ref 6.4–8.3)
QRS DURATION - MUSE: 86 MS
QT - MUSE: 360 MS
QTC - MUSE: 433 MS
R AXIS - MUSE: -11 DEGREES
RBC # BLD AUTO: 7.69 10E6/UL (ref 4.4–5.9)
SARS-COV-2 RNA RESP QL NAA+PROBE: NEGATIVE
SODIUM SERPL-SCNC: 138 MMOL/L (ref 136–145)
SYSTOLIC BLOOD PRESSURE - MUSE: NORMAL MMHG
T AXIS - MUSE: 24 DEGREES
VENTRICULAR RATE- MUSE: 87 BPM
WBC # BLD AUTO: 14.8 10E3/UL (ref 4–11)

## 2022-12-06 PROCEDURE — 85025 COMPLETE CBC W/AUTO DIFF WBC: CPT | Performed by: EMERGENCY MEDICINE

## 2022-12-06 PROCEDURE — 99285 EMERGENCY DEPT VISIT HI MDM: CPT | Mod: 25 | Performed by: EMERGENCY MEDICINE

## 2022-12-06 PROCEDURE — 36415 COLL VENOUS BLD VENIPUNCTURE: CPT | Performed by: EMERGENCY MEDICINE

## 2022-12-06 PROCEDURE — 258N000003 HC RX IP 258 OP 636: Performed by: EMERGENCY MEDICINE

## 2022-12-06 PROCEDURE — 85610 PROTHROMBIN TIME: CPT | Performed by: EMERGENCY MEDICINE

## 2022-12-06 PROCEDURE — 70450 CT HEAD/BRAIN W/O DYE: CPT

## 2022-12-06 PROCEDURE — 83690 ASSAY OF LIPASE: CPT | Performed by: EMERGENCY MEDICINE

## 2022-12-06 PROCEDURE — C9803 HOPD COVID-19 SPEC COLLECT: HCPCS | Performed by: EMERGENCY MEDICINE

## 2022-12-06 PROCEDURE — 250N000011 HC RX IP 250 OP 636: Performed by: EMERGENCY MEDICINE

## 2022-12-06 PROCEDURE — 93010 ELECTROCARDIOGRAM REPORT: CPT | Performed by: EMERGENCY MEDICINE

## 2022-12-06 PROCEDURE — 96374 THER/PROPH/DIAG INJ IV PUSH: CPT | Performed by: EMERGENCY MEDICINE

## 2022-12-06 PROCEDURE — 70450 CT HEAD/BRAIN W/O DYE: CPT | Mod: 26 | Performed by: RADIOLOGY

## 2022-12-06 PROCEDURE — 93005 ELECTROCARDIOGRAM TRACING: CPT | Performed by: EMERGENCY MEDICINE

## 2022-12-06 PROCEDURE — 80053 COMPREHEN METABOLIC PANEL: CPT | Performed by: EMERGENCY MEDICINE

## 2022-12-06 PROCEDURE — 87040 BLOOD CULTURE FOR BACTERIA: CPT | Performed by: EMERGENCY MEDICINE

## 2022-12-06 PROCEDURE — 999N000248 HC STATISTIC IV INSERT WITH US BY RN

## 2022-12-06 PROCEDURE — 96361 HYDRATE IV INFUSION ADD-ON: CPT | Performed by: EMERGENCY MEDICINE

## 2022-12-06 PROCEDURE — 83605 ASSAY OF LACTIC ACID: CPT | Performed by: EMERGENCY MEDICINE

## 2022-12-06 PROCEDURE — U0005 INFEC AGEN DETEC AMPLI PROBE: HCPCS | Performed by: EMERGENCY MEDICINE

## 2022-12-06 RX ORDER — SODIUM CHLORIDE 9 MG/ML
INJECTION, SOLUTION INTRAVENOUS CONTINUOUS
Status: DISCONTINUED | OUTPATIENT
Start: 2022-12-06 | End: 2022-12-06 | Stop reason: HOSPADM

## 2022-12-06 RX ORDER — ONDANSETRON 2 MG/ML
4 INJECTION INTRAMUSCULAR; INTRAVENOUS EVERY 30 MIN PRN
Status: DISCONTINUED | OUTPATIENT
Start: 2022-12-06 | End: 2022-12-06 | Stop reason: HOSPADM

## 2022-12-06 RX ORDER — POTASSIUM CHLORIDE 1.5 G/1.58G
40 POWDER, FOR SOLUTION ORAL ONCE
Status: COMPLETED | OUTPATIENT
Start: 2022-12-06 | End: 2022-12-06

## 2022-12-06 RX ADMIN — FAMOTIDINE 20 MG: 10 INJECTION, SOLUTION INTRAVENOUS at 15:13

## 2022-12-06 RX ADMIN — SODIUM CHLORIDE 1000 ML: 9 INJECTION, SOLUTION INTRAVENOUS at 15:10

## 2022-12-06 RX ADMIN — SODIUM CHLORIDE: 900 INJECTION, SOLUTION INTRAVENOUS at 17:10

## 2022-12-06 ASSESSMENT — ENCOUNTER SYMPTOMS
VOMITING: 1
NAUSEA: 1
FATIGUE: 1
DIARRHEA: 1
FEVER: 0
ABDOMINAL PAIN: 1

## 2022-12-06 ASSESSMENT — ACTIVITIES OF DAILY LIVING (ADL)
ADLS_ACUITY_SCORE: 35
ADLS_ACUITY_SCORE: 35

## 2022-12-06 NOTE — ED TRIAGE NOTES
"     Triage Assessment & Note:    /87   Pulse 102   Temp 98.8  F (37.1  C) (Temporal)   Resp 16   Ht 1.651 m (5' 5\")   SpO2 99%       Patient presents with: Pt BIBA from home with mother (guardian) after having a syncopal episode earlier in the day. Pt then had x2 unwitnessed seizures per EMS/ family. Pt notes n/v/d x3 days and recently stopping HIV rx. No reports of fever, cough, SOB, CP, or travel.      Home Treatments/Remedies: Home medications    Febrile / Afebrile: afebrile    Duration of C/o: < 12 hrs    Jennifer Green RN  December 6, 2022         "

## 2022-12-06 NOTE — ED PROVIDER NOTES
"      [place] EMERGENCY DEPARTMENT ([facility])  December 6, 2022     History     Chief Complaint   Patient presents with     Syncope     Seizures     Nausea, Vomiting, & Diarrhea     HPI  Awot D Alexis is a 37 year old male with a past medical history including acute DVT of RUE, HIV infection, MRSA infection, chronic viral Hep B who presents to the Emergency Department for evaluation of syncope.    Per the patient, he was at a tobacco shop this morning around 10 am. As he was paying he became weak and lightheaded before falling backward and hitting the ground. The next thing he remembers is someone standing over him asking if he was okay. The bystander then helped him sit up and he had another episode  of feeling faint and passing out while sitting, though it is unclear if this resulted in LOC. He believes he hit his head on the ground the first time he fell, and did not notice any bleeding. After a bystander called an ambulance, he was walked to the ambulance where he recovered for around 20-30min before refusing transport to a hospital and instead riding his bike home. He did make it home on his bike without difficulty. At home, he \"fainted\" an additional time in his hallway at home. He called 911 from the ground. It is unclear if there was any additional LOC, though every spell was accompanied with a prodrome of weakness and light headedness. Per the patient, witnesses at the scene reported shaking while he was unconscious. He did not bite his tongue, has not been incontinent.    He is HIV+ and has not been taking HAART for around one month due to not following up with his prescribing provider. He has felt generally unwell, including weakness and loss of appetite, during this time. For the past 4-5 days, he reports inability to eat or drink anything. He states that he will vomit as soon as he ingests anything. The last time he has attempted any oral intake was a drink at 4am this morning which " "caused him to vomit. He also reports diarrhea throughout this time, though it appears the last time he has had loose stools is yesterday morning.    Additional ROS positive for SOB for the past 4-5 days, abdominal pain; also reports history of asthma. No fever, chills.    Past Medical History  Past Medical History:   Diagnosis Date     Asymptomatic human immunodeficiency virus (HIV) infection status (H)      Latent syphilis     RPR converted to nonreactive 2020     History reviewed. No pertinent surgical history.  buprenorphine HCl-naloxone HCl (SUBOXONE) 8-2 MG per film  cloNIDine (CATAPRES) 0.1 MG tablet  traZODone (DESYREL) 50 MG tablet  naloxone (NARCAN) 4 MG/0.1ML nasal spray  ondansetron (ZOFRAN) 4 MG tablet      Allergies   Allergen Reactions     Bee Venom      Pyridoxine Hives     Family History  History reviewed. No pertinent family history.  Social History   Social History     Tobacco Use     Smoking status: Every Day     Types: Cigarettes, Vaping Device     Smokeless tobacco: Never     Tobacco comments:     smokes 1or 2 a day   Substance Use Topics     Alcohol use: No     Drug use: Not Currently     Comment: 3/14/21 4pm Fentanyl and THC a couple weeks ago      Past medical history, past surgical history, medications, allergies, family history, and social history were reviewed with the patient. No additional pertinent items.       Review of Systems   Constitutional: Positive for fatigue. Negative for fever.   Gastrointestinal: Positive for abdominal pain, diarrhea, nausea and vomiting.   All other systems reviewed and are negative.    A complete review of systems was performed with pertinent positives and negatives noted in the HPI, and all other systems negative.    Physical Exam   BP: 138/87  Pulse: 102  Temp: 98.8  F (37.1  C)  Resp: 16  Height: 165.1 cm (5' 5\")  SpO2: 99 %  Physical Exam  HENT:      Head: Normocephalic.      Nose: Nose normal.      Mouth/Throat:      Mouth: Mucous membranes are moist. "      Pharynx: Oropharynx is clear.   Eyes:      General: No scleral icterus.     Extraocular Movements: Extraocular movements intact.      Conjunctiva/sclera: Conjunctivae normal.      Pupils: Pupils are equal, round, and reactive to light.   Cardiovascular:      Rate and Rhythm: Normal rate.      Pulses: Normal pulses.      Heart sounds: Normal heart sounds.   Pulmonary:      Effort: Pulmonary effort is normal.      Breath sounds: Normal breath sounds.   Abdominal:      General: Abdomen is flat.      Palpations: Abdomen is soft.   Musculoskeletal:      Cervical back: Normal range of motion and neck supple.   Skin:     General: Skin is dry.   Neurological:      General: No focal deficit present.      Mental Status: He is alert and oriented to person, place, and time.      Cranial Nerves: No cranial nerve deficit.      Sensory: No sensory deficit.       ED Course      Procedures       The medical record was reviewed and interpreted.  Current labs reviewed and interpreted.  Previous labs reviewed and interpreted.         Results for orders placed or performed during the hospital encounter of 12/06/22   CT Head w/o Contrast     Status: None    Narrative    EXAM: CT HEAD W/O CONTRAST  12/6/2022 2:57 PM     HISTORY:  syncope       COMPARISON:  None    TECHNIQUE: Using multidetector thin collimation helical acquisition  technique, axial, coronal and sagittal CT images from the skull base  to the vertex were obtained without intravenous contrast.   (topogram) image(s) also obtained and reviewed.    FINDINGS:  No acute intracranial hemorrhage, mass effect, or midline shift. No  acute loss of gray-white matter differentiation in the cerebral  hemispheres. Ventricles are proportionate to the cerebral sulci. Clear  basal cisterns.    The bony calvaria and the bones of the skull base are normal. Polypoid  mucosal thickening of the maxillary sinuses with scattered mild  thickening of the ethmoid air cells. The mastoid air  cells are clear.  Grossly normal orbits.       Impression    IMPRESSION: No acute intracranial pathology.     I have personally reviewed the examination and initial interpretation  and I agree with the findings.    HERMELINDA DE LA CRUZ MD         SYSTEM ID:  [id number]   Lactic acid whole blood     Status: Normal   Result Value Ref Range    Lactic Acid 1.9 0.7 - 2.0 mmol/L   INR     Status: Normal   Result Value Ref Range    INR 1.13 0.85 - 1.15   CBC with platelets and differential     Status: Abnormal   Result Value Ref Range    WBC Count 14.8 (H) 4.0 - 11.0 10e3/uL    RBC Count 7.69 (H) 4.40 - 5.90 10e6/uL    Hemoglobin 21.4 (H) 13.3 - 17.7 g/dL    Hematocrit 64.6 (H) 40.0 - 53.0 %    MCV 84 78 - 100 fL    MCH 27.8 26.5 - 33.0 pg    MCHC 33.1 31.5 - 36.5 g/dL    RDW 14.1 10.0 - 15.0 %    Platelet Count 251 150 - 450 10e3/uL    % Neutrophils 81 %    % Lymphocytes 13 %    % Monocytes 6 %    % Eosinophils 0 %    % Basophils 0 %    % Immature Granulocytes 0 %    NRBCs per 100 WBC 0 <1 /100    Absolute Neutrophils 10.6 (H) 1.6 - 8.3 10e3/uL    Absolute Lymphocytes 1.8 0.8 - 5.3 10e3/uL    Absolute Monocytes 0.8 0.0 - 1.3 10e3/uL    Absolute Eosinophils 0.1 0.0 - 0.7 10e3/uL    Absolute Basophils 0.1 0.0 - 0.2 10e3/uL    Absolute Immature Granulocytes 0.1 <=0.4 10e3/uL    Absolute NRBCs 0.0 10e3/uL   EKG 12-lead     Status: None (Preliminary result)   Result Value Ref Range    Systolic Blood Pressure  mmHg    Diastolic Blood Pressure  mmHg    Ventricular Rate 87 BPM    Atrial Rate 87 BPM    IA Interval 136 ms    QRS Duration 86 ms     ms    QTc 433 ms    P Axis 40 degrees    R AXIS -11 degrees    T Axis 24 degrees    Interpretation ECG       Sinus rhythm  Nonspecific ST abnormality  Abnormal ECG     CBC with platelets differential     Status: Abnormal    Narrative    The following orders were created for panel order CBC with platelets differential.  Procedure                               Abnormality         Status                      ---------                               -----------         ------                     CBC with platelets and d...[[id number]]  Abnormal            Final result                 Please view results for these tests on the individual orders.     Medications   0.9% sodium chloride BOLUS (0 mLs Intravenous Stopped 12/6/22 1617)     Followed by   sodium chloride 0.9% infusion (has no administration in time range)   ondansetron (ZOFRAN) injection 4 mg (has no administration in time range)   famotidine (PEPCID) injection 20 mg (20 mg Intravenous Given 12/6/22 1513)          Results for orders placed or performed during the hospital encounter of 12/06/22   EKG 12-lead     Status: None (Preliminary result)   Result Value Ref Range    Systolic Blood Pressure  mmHg    Diastolic Blood Pressure  mmHg    Ventricular Rate 87 BPM    Atrial Rate 87 BPM    OK Interval 136 ms    QRS Duration 86 ms     ms    QTc 433 ms    P Axis 40 degrees    R AXIS -11 degrees    T Axis 24 degrees    Interpretation ECG       Sinus rhythm  Nonspecific ST abnormality  Abnormal ECG       Medications   0.9% sodium chloride BOLUS (has no administration in time range)     Followed by   sodium chloride 0.9% infusion (has no administration in time range)   ondansetron (ZOFRAN) injection 4 mg (has no administration in time range)   famotidine (PEPCID) injection 20 mg (has no administration in time range)        Assessments & Plan (with Medical Decision Making)   Blayne Espinoza is a 37 year old male with a past medical history including acute DVT of RUE, HIV infection, MRSA infection, chronic viral Hep B who presents to the Emergency Department for evaluation of syncope.    Blayne has been feeling unwell for around a month with acute worsening 4-5 days ago with minimal oral intake, nausea, vomiting, and potentially diarrhea. It is therefore likely that he has acquired gastroenteritis or colitis leading to his symptoms and eventually  orthostatic fainting spells. We will plan to check basic labs, covid/flu swab, bcx, lactate to assess for electrolyte derangements or additional infectious cause of his spells. As he likely struck his head, will assess with head CT.    Additionally, as he has not been on HAART for the last month, it is also possible that he has aquired an AIDS defining illness, though less likely as his viral load was undetectable on last check on 12/15/22. Seizure is also unlikely in this patient due to lack of history of seizures, lack of symptoms such as tongue biting, post-ictal state, loss of bowel or bladder function.     -Labs: CBC, CMP, lactate, blood cultures, UA, covid/flu swab, lipase  -CTH, EKG  -IV NS  -Zofran 4mg  -F/u with HIV provider      I have reviewed the nursing notes. I have reviewed the findings, diagnosis, plan and need for follow up with the patient.    New Prescriptions    No medications on file       Final diagnoses:   None     Luca Johnson, MS4    --    ED Attending Physician Attestation    I Ana Maria Woodruff MD, cared for this patient with the Medical Student. I performed, or re-performed, the physical exam and medical decision-making. I have verified the accuracy of all the medical student findings and documentation above, and have edited as necessary.    Summary of HPI, PE, ED Course   Patient is a 37 year old male evaluated in the emergency department for feeling unwell with diarrhea/bodyaches .  Patient says that he has been sick with diarrhea and vomiting for the past 4 days.  Patient says he vomited all last night though he has had no vomiting today.  Patient's not been eating or drinking because of his upset stomach.  Patient has that today he really wanted to get some vape so ended up breaking to the vape store which is about 6 blocks away.  Says when he got there he ended up feeling dizzy and ended up having a syncopal episode while he was at the register pain.  Patient says he fell to the  ground was able to sit up briefly then again having to lay down.  Does not appear that patient actually had a loss of consciousness episode second time.  Patient was cleared by an ambulance there and ended up biking back home and again started feeling more weak and dizzy.  Says that he laid in the hallway though it appears that he was able to lay down by himself but did not have a fall or loss of consciousness episode.  Patient is accompanied in the ER with his mother.  No acute abdominal pain.  Abdomen soft and nontender.  No reproducible tenderness.  Patient is alert and oriented.  Moving all extremities.  Cranial nerves are intact.  Exam notable for . ED course notable for patient had a CT head that is normal.  Plan will be to give him an IV and check some basic labs.  Patient will likely need to be admitted to the ED observation unit for hydration pending his lab results.  Labs are still pending at 5:05 PM.  Lab was notified that his labs are still pending and they are going to run the lab work..  Plan was for the patient to be admitted to the ED observation unit due to his recurrent syncopal episodes and his dehydration with some STEPHANI and hypokalemia.  Patient however is very adamant that he does not want to be admitted to the hospital.  He says he received 1 L of IV fluid is feeling back to normal.  Patient's had no diarrhea while in the ER.  It was stressed to the patient that he needs to follow-up with his HIV doctor to be restarted on his meds.  At this time I do not see any obvious AIDS defining illnesses but he needs to follow-up for further care.  I also need to make sure there is no other cause of his diarrhea also he is to follow-up for further testing.  Patient continues to decline observation admission.  He patient will therefore be discharged home with his mother.     After the completion of care in the emergency department, the patient was discharged.    Results for orders placed or performed during  the hospital encounter of 12/06/22   CT Head w/o Contrast     Status: None    Narrative    EXAM: CT HEAD W/O CONTRAST  12/6/2022 2:57 PM     HISTORY:  syncope       COMPARISON:  None    TECHNIQUE: Using multidetector thin collimation helical acquisition  technique, axial, coronal and sagittal CT images from the skull base  to the vertex were obtained without intravenous contrast.   (topogram) image(s) also obtained and reviewed.    FINDINGS:  No acute intracranial hemorrhage, mass effect, or midline shift. No  acute loss of gray-white matter differentiation in the cerebral  hemispheres. Ventricles are proportionate to the cerebral sulci. Clear  basal cisterns.    The bony calvaria and the bones of the skull base are normal. Polypoid  mucosal thickening of the maxillary sinuses with scattered mild  thickening of the ethmoid air cells. The mastoid air cells are clear.  Grossly normal orbits.       Impression    IMPRESSION: No acute intracranial pathology.     I have personally reviewed the examination and initial interpretation  and I agree with the findings.    HERMELINDA DE LA CRUZ MD         SYSTEM ID:  [id number]   Lactic acid whole blood     Status: Normal   Result Value Ref Range    Lactic Acid 1.9 0.7 - 2.0 mmol/L   INR     Status: Normal   Result Value Ref Range    INR 1.13 0.85 - 1.15   CBC with platelets and differential     Status: Abnormal   Result Value Ref Range    WBC Count 14.8 (H) 4.0 - 11.0 10e3/uL    RBC Count 7.69 (H) 4.40 - 5.90 10e6/uL    Hemoglobin 21.4 (H) 13.3 - 17.7 g/dL    Hematocrit 64.6 (H) 40.0 - 53.0 %    MCV 84 78 - 100 fL    MCH 27.8 26.5 - 33.0 pg    MCHC 33.1 31.5 - 36.5 g/dL    RDW 14.1 10.0 - 15.0 %    Platelet Count 251 150 - 450 10e3/uL    % Neutrophils 81 %    % Lymphocytes 13 %    % Monocytes 6 %    % Eosinophils 0 %    % Basophils 0 %    % Immature Granulocytes 0 %    NRBCs per 100 WBC 0 <1 /100    Absolute Neutrophils 10.6 (H) 1.6 - 8.3 10e3/uL    Absolute Lymphocytes 1.8 0.8  - 5.3 10e3/uL    Absolute Monocytes 0.8 0.0 - 1.3 10e3/uL    Absolute Eosinophils 0.1 0.0 - 0.7 10e3/uL    Absolute Basophils 0.1 0.0 - 0.2 10e3/uL    Absolute Immature Granulocytes 0.1 <=0.4 10e3/uL    Absolute NRBCs 0.0 10e3/uL   EKG 12-lead     Status: None (Preliminary result)   Result Value Ref Range    Systolic Blood Pressure  mmHg    Diastolic Blood Pressure  mmHg    Ventricular Rate 87 BPM    Atrial Rate 87 BPM    NH Interval 136 ms    QRS Duration 86 ms     ms    QTc 433 ms    P Axis 40 degrees    R AXIS -11 degrees    T Axis 24 degrees    Interpretation ECG       Sinus rhythm  Nonspecific ST abnormality  Abnormal ECG     CBC with platelets differential     Status: Abnormal    Narrative    The following orders were created for panel order CBC with platelets differential.  Procedure                               Abnormality         Status                     ---------                               -----------         ------                     CBC with platelets and d...[[id number]]  Abnormal            Final result                 Please view results for these tests on the individual orders.     Medications   0.9% sodium chloride BOLUS (0 mLs Intravenous Stopped 12/6/22 1617)     Followed by   sodium chloride 0.9% infusion (has no administration in time range)   ondansetron (ZOFRAN) injection 4 mg (has no administration in time range)   famotidine (PEPCID) injection 20 mg (20 mg Intravenous Given 12/6/22 1513)          EKG Interpretation:      Interpreted by Ana Maria Woodruff MD  Time reviewed:1340   Symptoms at time of EKG: none   Rhythm: normal sinus   Rate: normal  Axis: left  Ectopy: none  Conduction: normal  ST Segments/ T Waves: No ST-T wave changes  Q Waves: none  Comparison to prior: No old EKG available    Clinical Impression: normal EKG      Critical Care & Procedures  Not applicable.    Medical Decision Making  The medical record was reviewed and interpreted.  Current labs reviewed and  interpreted.  Previous labs reviewed and interpreted.      Ana Maria Woodruff MD  Emergency Medicine         --    [facility] EMERGENCY DEPARTMENT  12/6/2022     Ana Maria Woodruff MD  12/06/22 1700       Ana Maria Woodruff MD  12/06/22 1404

## 2022-12-11 LAB
BACTERIA BLD CULT: NO GROWTH
BACTERIA BLD CULT: NO GROWTH

## 2023-12-19 NOTE — DISCHARGE INSTRUCTIONS
Return to work note sent to patients mychart  Patient informed   You likely lost consciousness due to your diarrhea/vomiting.     Your blood work shows dehydration so drink liquids and take multiple small meals.     Please follow up with your primary doctor to be restarted on your HIV medications.     Return to the ER if symptoms worsen.